# Patient Record
Sex: MALE | Race: WHITE | NOT HISPANIC OR LATINO | Employment: OTHER | URBAN - METROPOLITAN AREA
[De-identification: names, ages, dates, MRNs, and addresses within clinical notes are randomized per-mention and may not be internally consistent; named-entity substitution may affect disease eponyms.]

---

## 2019-08-04 ENCOUNTER — HOSPITAL ENCOUNTER (EMERGENCY)
Facility: HOSPITAL | Age: 64
Discharge: HOME | End: 2019-08-04
Attending: EMERGENCY MEDICINE | Admitting: EMERGENCY MEDICINE

## 2019-08-04 VITALS
TEMPERATURE: 98.6 F | DIASTOLIC BLOOD PRESSURE: 78 MMHG | RESPIRATION RATE: 18 BRPM | OXYGEN SATURATION: 98 % | WEIGHT: 178.79 LBS | SYSTOLIC BLOOD PRESSURE: 125 MMHG | HEART RATE: 89 BPM

## 2019-08-04 DIAGNOSIS — R04.0 EPISTAXIS: Primary | ICD-10-CM

## 2019-08-04 PROCEDURE — 99283 EMERGENCY DEPT VISIT LOW MDM: CPT | Performed by: EMERGENCY MEDICINE

## 2019-08-04 PROCEDURE — 99283 EMERGENCY DEPT VISIT LOW MDM: CPT

## 2019-08-04 RX ORDER — TRANEXAMIC ACID 100 MG/ML
500 INJECTION, SOLUTION INTRAVENOUS ONCE
Status: COMPLETED | OUTPATIENT
Start: 2019-08-04 | End: 2019-08-04

## 2019-08-04 RX ORDER — LEVOTHYROXINE SODIUM 0.2 MG/1
200 TABLET ORAL DAILY
COMMUNITY

## 2019-08-04 RX ORDER — OXYMETAZOLINE HYDROCHLORIDE 0.05 G/100ML
2 SPRAY NASAL ONCE
Status: COMPLETED | OUTPATIENT
Start: 2019-08-04 | End: 2019-08-04

## 2019-08-04 RX ADMIN — TRANEXAMIC ACID 500 MG: 1 INJECTION, SOLUTION INTRAVENOUS at 16:06

## 2019-08-04 RX ADMIN — OXYMETAZOLINE HYDROCHLORIDE 2 SPRAY: 0.05 SPRAY NASAL at 16:06

## 2019-08-04 RX ADMIN — SILVER NITRATE APPLICATORS 1 APPLICATOR: 25; 75 STICK TOPICAL at 16:08

## 2019-08-04 NOTE — ED PROVIDER NOTES
History  Chief Complaint   Patient presents with    Nose Bleed     pt c/o nose bleed since yesterday has been on/off, has been on both sides, states started after sticking nose in bag of charcoal      61-year-old male with epistaxis  Onset yesterday  Began shortly after he stuck his nose in a bag of charcoal  Initially had a right-sided nosebleed which resolved and approximately 4 hours prior to arrival began having left-sided nosebleed  Denies trauma or injury  Has stopped and started 9 times since yesterday  Takes antiplatelet daily but no anticoagulant  Has never has nose bleed like this in the past  Denies lightheadedness, dizziness, n/v, chest pain or sob  No n/v  History provided by:  Patient   used: No    Nose Bleed   Location:  L nare  Severity:  Moderate  Duration:  4 hours  Timing:  Intermittent  Progression:  Unchanged  Chronicity:  New  Context: aspirin use    Context: not anticoagulants, not BiPAP, not bleeding disorder, not CPAP, not drug use, not elevation change, not foreign body, not home oxygen, not hypertension, not nose picking, not recent infection, not thrombocytopenia, not trauma and not weather change    Associated symptoms: no congestion, no cough, no dizziness, no fever, no headaches and no sore throat        Prior to Admission Medications   Prescriptions Last Dose Informant Patient Reported? Taking?   levothyroxine 200 mcg tablet   Yes Yes   Sig: Take 200 mcg by mouth daily      Facility-Administered Medications: None       Past Medical History:   Diagnosis Date    Asthma     Disease of thyroid gland     Migraine        History reviewed  No pertinent surgical history  History reviewed  No pertinent family history  I have reviewed and agree with the history as documented      Social History     Tobacco Use    Smoking status: Current Every Day Smoker     Types: Cigarettes   Substance Use Topics    Alcohol use: Never     Frequency: Never    Drug use: Never        Review of Systems   Constitutional: Negative for activity change, appetite change, chills, diaphoresis, fatigue, fever and unexpected weight change  HENT: Positive for nosebleeds  Negative for congestion, rhinorrhea, sinus pressure, sore throat and trouble swallowing  Eyes: Negative for photophobia and visual disturbance  Respiratory: Negative for apnea, cough, choking, chest tightness, shortness of breath, wheezing and stridor  Cardiovascular: Negative for chest pain, palpitations and leg swelling  Gastrointestinal: Negative for abdominal distention, abdominal pain, blood in stool, constipation, diarrhea, nausea and vomiting  Genitourinary: Negative for decreased urine volume, difficulty urinating, dysuria, enuresis, flank pain, frequency, hematuria and urgency  Musculoskeletal: Negative for arthralgias, myalgias, neck pain and neck stiffness  Skin: Negative for color change, pallor, rash and wound  Allergic/Immunologic: Negative  Neurological: Negative for dizziness, tremors, syncope, weakness, light-headedness, numbness and headaches  Hematological: Negative  Psychiatric/Behavioral: Negative  All other systems reviewed and are negative  Physical Exam  Physical Exam   Constitutional: He is oriented to person, place, and time  He appears well-developed and well-nourished  Non-toxic appearance  He does not have a sickly appearance  He does not appear ill  No distress  HENT:   Head: Normocephalic and atraumatic  Nose: No mucosal edema, rhinorrhea, nose lacerations, sinus tenderness, nasal deformity, septal deviation or nasal septal hematoma  Epistaxis (Left naris) is observed  No foreign bodies  Epistaxis right naris  Appears anterior  Eyes: Pupils are equal, round, and reactive to light  EOM and lids are normal    Neck: Normal range of motion  Neck supple     Cardiovascular: Normal rate, regular rhythm, S1 normal, S2 normal, normal heart sounds, intact distal pulses and normal pulses  Exam reveals no gallop, no distant heart sounds, no friction rub and no decreased pulses  No murmur heard  Pulses:       Radial pulses are 2+ on the right side, and 2+ on the left side  Pulmonary/Chest: Effort normal and breath sounds normal  No accessory muscle usage  No apnea, no tachypnea and no bradypnea  No respiratory distress  He has no decreased breath sounds  He has no wheezes  He has no rhonchi  He has no rales  Musculoskeletal: Normal range of motion  He exhibits no edema, tenderness or deformity  Neurological: He is alert and oriented to person, place, and time  No cranial nerve deficit  GCS eye subscore is 4  GCS verbal subscore is 5  GCS motor subscore is 6  Skin: Skin is warm, dry and intact  No rash noted  He is not diaphoretic  No erythema  No pallor  Psychiatric: His speech is normal    Nursing note and vitals reviewed        Vital Signs  ED Triage Vitals [08/04/19 1541]   Temperature Pulse Respirations Blood Pressure SpO2   98 6 °F (37 °C) 93 20 127/81 97 %      Temp Source Heart Rate Source Patient Position - Orthostatic VS BP Location FiO2 (%)   Oral Monitor Sitting Left arm --      Pain Score       No Pain           Vitals:    08/04/19 1541 08/04/19 1659   BP: 127/81 125/78   Pulse: 93 89   Patient Position - Orthostatic VS: Sitting          Visual Acuity      ED Medications  Medications   oxymetazoline (AFRIN) 0 05 % nasal spray 2 spray (2 sprays Each Nare Given 8/4/19 1606)   tranexamic acid 100mg/mL (for epistaxis) 500 mg (500 mg Nasal Given 8/4/19 1606)   silver nitrate-potassium nitrate (ARZOL SILVER NITRATE) 21-44 % applicator 1 applicator (1 applicator Topical Given 8/4/19 1608)       Diagnostic Studies  Results Reviewed     None                 No orders to display              Procedures  Procedures       ED Course                               MDM  Number of Diagnoses or Management Options  Epistaxis: new and requires workup  Diagnosis management comments: Differential diagnosis including but not limited to: anterior epistaxis, posterior epistaxis, anemia, bleeding diathesis, coagulopathy, hypertensive urgency  Plan: afrin, txa, (+/-) cauterization if exact source can be identified in order to achieve hemostasis  No symptoms of acute blood loss anemia and normal HR  Defer labs  Risk of Complications, Morbidity, and/or Mortality  Presenting problems: low  Management options: low  General comments: 60-year-old male with nosebleed  Hemostasis was achieved with Afrin and TXA  He has no symptoms of acute blood loss anemia  Recommended close follow up with PCP  If this becomes a recurrent issue he may ultimately need to see ENT but no indication for urgent consult now  We discussed return parameters  We discussed at home care of nose bleeds including Afrin and direct pressure  Return parameters discussed  Patient understands and agrees with this plan  Patient Progress  Patient progress: stable      Disposition  Final diagnoses:   Epistaxis     Time reflects when diagnosis was documented in both MDM as applicable and the Disposition within this note     Time User Action Codes Description Comment    8/4/2019  5:14 PM John Casiano Add [R04 0] Epistaxis       ED Disposition     ED Disposition Condition Date/Time Comment    Discharge Stable Sun Aug 4, 2019  5:14 PM Eleanor Paz discharge to home/self care  Follow-up Information     Follow up With Specialties Details Why [address] [place]  Call   [facility]  [phone]            Patient's Medications   Discharge Prescriptions    No medications on file     No discharge procedures on file      ED Provider  Electronically Signed by           Laura Ambriz PA-C  08/04/19 8026

## 2019-08-04 NOTE — DISCHARGE INSTRUCTIONS
Return to the Emergency Department sooner if increased bleeding, pain, fever, vomiting, difficulty breathing, dizziness, weakness  Direct pressure if re-bleeding